# Patient Record
Sex: FEMALE | Employment: STUDENT | URBAN - METROPOLITAN AREA
[De-identification: names, ages, dates, MRNs, and addresses within clinical notes are randomized per-mention and may not be internally consistent; named-entity substitution may affect disease eponyms.]

---
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
## 2021-12-16 ENCOUNTER — OFFICE VISIT (OUTPATIENT)
Dept: FAMILY MEDICINE CLINIC | Facility: CLINIC | Age: 21
End: 2021-12-16
Payer: COMMERCIAL

## 2021-12-16 VITALS
DIASTOLIC BLOOD PRESSURE: 62 MMHG | RESPIRATION RATE: 18 BRPM | TEMPERATURE: 98 F | BODY MASS INDEX: 25.69 KG/M2 | WEIGHT: 145 LBS | OXYGEN SATURATION: 98 % | SYSTOLIC BLOOD PRESSURE: 116 MMHG | HEIGHT: 63 IN | HEART RATE: 88 BPM

## 2021-12-16 DIAGNOSIS — Z20.828 EXPOSURE TO INFLUENZA: ICD-10-CM

## 2021-12-16 DIAGNOSIS — J06.9 VIRAL UPPER RESPIRATORY ILLNESS: Primary | ICD-10-CM

## 2021-12-16 PROCEDURE — 3074F SYST BP LT 130 MM HG: CPT | Performed by: NURSE PRACTITIONER

## 2021-12-16 PROCEDURE — 3008F BODY MASS INDEX DOCD: CPT | Performed by: NURSE PRACTITIONER

## 2021-12-16 PROCEDURE — 3078F DIAST BP <80 MM HG: CPT | Performed by: NURSE PRACTITIONER

## 2021-12-16 PROCEDURE — 99213 OFFICE O/P EST LOW 20 MIN: CPT | Performed by: NURSE PRACTITIONER

## 2021-12-16 PROCEDURE — 87637 SARSCOV2&INF A&B&RSV AMP PRB: CPT | Performed by: NURSE PRACTITIONER

## 2021-12-16 NOTE — PATIENT INSTRUCTIONS
Influenza (Adult)    Influenza is also called the flu. It's a viral illness that affects the air passages of your lungs. It's different from the common cold. The flu can easily be passed from one to person to another.  It may be spread through the air by in your nose and sinuses. Don’t use a decongestant if you have high blood pressure. · Stay home until your fever has been gone for at least 24 hours without using medicine to reduce fever.   Follow-up care  Follow up with your healthcare provider, or as ad nose, or mouth. The flu starts 1 to 3 days after you are exposed to the flu virus. It may last for 1 to 2 weeks but sometimes people feel tired or fatigued for many weeks afterward.  You usually don’t need to take antibiotics unless you are at high risk f provider about getting a pneumococcal vaccine every 5 years. You should also get this vaccine if you have chronic asthma or COPD. All adults should get a flu vaccine every fall. Ask your provider about this.    When to seek medical advice  Call your health

## 2021-12-16 NOTE — PROGRESS NOTES
Patient presents with:  Flu  :    HPI:   Pink Overall is a 22year old female who presents for upper respiratory symptoms for  3  days. Started suddenly. Symptoms have been better since onset.   Feeling feverish,chills, congestion, cough, malaise, janine reddened and swollen  THROAT: oral mucosa pink, moist. No visible dental caries. Posterior pharynx is not erythematous. no exudates. NECK: supple, non-tender  LUNGS: clear to auscultation bilaterally, no wheezes or rhonchi. Breathing is non labored.   Dry through the air by coughing and sneezing. Or it can be spread by touching the sick person and then touching your own eyes, nose, or mouth. The flu starts 1 to 3 days after you are exposed to the flu virus.  It may last for 1 to 2 weeks but sometimes peopl provider, or as advised, if you are not getting better over the next week. If you are age 72 or older, talk with your provider about getting a pneumococcal vaccine every 5 years. You should also get this vaccine if you have chronic asthma or COPD.  All ad are at high risk for or have a complication . This might be an ear or sinus infection or pneumonia. Symptoms of the flu may be mild or severe.  They can include extreme tiredness (wanting to stay in bed all day), chills, fevers, muscle aches, soreness wit advice  Call your healthcare provider right away if you have the flu and any of these occur:   · Cough with lots of colored mucus (sputum) or blood in your mucus  · Chest pain, shortness of breath, wheezing, or trouble breathing  · Severe headache, or face

## 2022-07-02 ENCOUNTER — WALK IN (OUTPATIENT)
Dept: URGENT CARE | Age: 22
End: 2022-07-02

## 2022-07-02 VITALS
SYSTOLIC BLOOD PRESSURE: 100 MMHG | TEMPERATURE: 100.2 F | DIASTOLIC BLOOD PRESSURE: 74 MMHG | HEART RATE: 94 BPM | WEIGHT: 146.4 LBS | RESPIRATION RATE: 16 BRPM | OXYGEN SATURATION: 98 % | HEIGHT: 62 IN | BODY MASS INDEX: 26.94 KG/M2

## 2022-07-02 DIAGNOSIS — J02.0 STREP PHARYNGITIS: Primary | ICD-10-CM

## 2022-07-02 PROCEDURE — 99214 OFFICE O/P EST MOD 30 MIN: CPT | Performed by: REGISTERED NURSE

## 2022-07-02 RX ORDER — ACETAMINOPHEN 325 MG/1
650 TABLET ORAL EVERY 6 HOURS PRN
COMMUNITY

## 2022-07-02 RX ORDER — TRETINOIN 1 MG/G
1 CREAM TOPICAL NIGHTLY
COMMUNITY
Start: 2022-03-22

## 2022-07-02 RX ORDER — IBUPROFEN 200 MG
400 TABLET ORAL EVERY 6 HOURS PRN
COMMUNITY

## 2022-07-02 RX ORDER — PENICILLIN V POTASSIUM 500 MG/1
500 TABLET ORAL 2 TIMES DAILY
Qty: 20 TABLET | Refills: 0 | Status: SHIPPED | OUTPATIENT
Start: 2022-07-02 | End: 2022-07-12

## 2023-07-05 ENCOUNTER — COMPREHENSIVE EXAM (OUTPATIENT)
Dept: URBAN - METROPOLITAN AREA CLINIC 32 | Facility: CLINIC | Age: 23
End: 2023-07-05

## 2023-07-05 DIAGNOSIS — H52.203: ICD-10-CM

## 2023-07-05 DIAGNOSIS — H43.393: ICD-10-CM

## 2023-07-05 PROCEDURE — 92004 COMPRE OPH EXAM NEW PT 1/>: CPT

## 2023-07-05 PROCEDURE — 92015 DETERMINE REFRACTIVE STATE: CPT

## 2023-07-05 ASSESSMENT — VISUAL ACUITY
OD_SC: 20/20
OU_SC: J1+
OD_SC: J1+
OS_SC: J1+
OS_SC: 20/20
OU_SC: 20/15

## 2023-07-05 ASSESSMENT — TONOMETRY
OS_IOP_MMHG: 12
OD_IOP_MMHG: 11

## 2024-11-14 ENCOUNTER — COMPREHENSIVE EXAM (OUTPATIENT)
Dept: URBAN - METROPOLITAN AREA CLINIC 27 | Facility: CLINIC | Age: 24
End: 2024-11-14

## 2024-11-14 DIAGNOSIS — H52.223: ICD-10-CM

## 2024-11-14 PROCEDURE — 92015 DETERMINE REFRACTIVE STATE: CPT

## 2024-11-14 PROCEDURE — 92014 COMPRE OPH EXAM EST PT 1/>: CPT

## (undated) NOTE — ED AVS SNAPSHOT
Masoud Juan Francisco   MRN: [id number]    Department:  [facility] Emergency Department   Date of Visit:  12/22/2019           Disclosure     Insurance plans vary and the physician(s) referred by the ER may not be covered by your plan.  Please contact tell this physician (or your personal doctor if your instructions are to return to your personal doctor) about any new or lasting problems. The primary care or specialist physician will see patients referred from the [facility] Emergency Department.  Mana Proctor